# Patient Record
(demographics unavailable — no encounter records)

---

## 2025-01-06 NOTE — HISTORY OF PRESENT ILLNESS
[de-identified] : 05/31/2023 Ms. COLUMBA PRIDE is a 67 year female that comes in today with a chief complaint of Warren knee pt states she aha been having problem with her knees for over 20 years .  08/30/2023: Pt is here to follow up on bilateral knee pain, pt states her pain has worsened since the last visit. She has a hernia operation on 09/20. Ambulating with cane. Pt states she feels a "crunch" when coming down the stairs. Getting out of car is painful.  12/06/2023 : Patient states CSI shot last a couple weeks but wore off. She is here today for bilateral CSI.   01/31/2024: Pt is here today to follow up on bilateral knee. Patient reports no change since the last visit.  Had two surgeries for bowel obstruction.   03/04/2024: Pt is here today to follow up on bilateral knee pain.   04/01/2024: follow up injection Warren knee euflexxa. Inj 1 of 3  04/08/2024: Pt is here today for Euflexxa injection 2 of 3 in bilateral knee  04/15/2024: COLUMBA is here today for BILATERAL knee injection #3.   06/03/2024: Patient is here today to follow up on bilateral knee pain. She reports that she has been experiencing pain lately, R>L pain. There is swelling at the site, pt would like CSI today.   10/28/2024: COLUMBA is here today for BILATERAL knee follow up. she is here for aspiration and CSI today.   01/06/2025:  COLUMBA is here today for BILATERAL knee follow up. she is here for aspiration and CSI today.  
[de-identified] : 05/31/2023 Ms. COLUMBA PRIDE is a 67 year female that comes in today with a chief complaint of Warren knee pt states she aha been having problem with her knees for over 20 years .  08/30/2023: Pt is here to follow up on bilateral knee pain, pt states her pain has worsened since the last visit. She has a hernia operation on 09/20. Ambulating with cane. Pt states she feels a "crunch" when coming down the stairs. Getting out of car is painful.  12/06/2023 : Patient states CSI shot last a couple weeks but wore off. She is here today for bilateral CSI.   01/31/2024: Pt is here today to follow up on bilateral knee. Patient reports no change since the last visit.  Had two surgeries for bowel obstruction.   03/04/2024: Pt is here today to follow up on bilateral knee pain.   04/01/2024: follow up injection Warren knee euflexxa. Inj 1 of 3  04/08/2024: Pt is here today for Euflexxa injection 2 of 3 in bilateral knee  04/15/2024: COLUMBA is here today for BILATERAL knee injection #3.   06/03/2024: Patient is here today to follow up on bilateral knee pain. She reports that she has been experiencing pain lately, R>L pain. There is swelling at the site, pt would like CSI today.   10/28/2024: COLUMBA is here today for BILATERAL knee follow up. she is here for aspiration and CSI today.   01/06/2025:  COLUMBA is here today for BILATERAL knee follow up. she is here for aspiration and CSI today.  
No

## 2025-01-06 NOTE — IMAGING
[de-identified] : Constitutional: well developed and well nourished, able to communicate Cardiovascular: Peripheral vascular exam is grossly normal Neurologic: Alert and oriented, no acute distress. Skin: normal skin with no ulcers, rashes, or lesions Pulmonary: No respiratory distress, breathing comfortably on room air Lymphatics: No obvious lymphadenopathy or lymphedema in areas examined  bilateral KNEE EXAM Alignment: severe varus  Effusion: None Atrophy: None                                                  Stable to Varus/valgus stress Posterior Drawer Test: negative Anterior Drawer Test: Negative Knee Extension/Flexion: 0 / 120  Medial/lateral compartments Medial joint line: POS Tenderness Lateral joint line: No Tenderness Herve test: negative  Patellofemoral joint Medial patellar facet: no tenderness Patellar grind: Negative  Tendons: Pes Anserine: No tenderness Gerdy's Tubercle/ IT Band: No tenderness Quadriceps Tendon: No Tenderness patellar tendon: no Tenderness Tibial tubercle: not tenderness Calf: no Tenderness  Neurovascular exam Muscle strength: 5/5 Sensation to light touch: intact Distal pulses: 2+  [id number] Xrays of the Right Knee were taken demonstrating tricompartmental arthritis with joint space collapse, osteophytes, and sclerosis  2025 XR of bilateral knees with significant progression of OA compared to prior XR, There is significant medial defect with R worse then left.

## 2025-01-06 NOTE — ASSESSMENT
[FreeTextEntry1] : 67 year F with bilateral knee OA - physical therapy, NSAIDs - CSI at this time bilateral knees - declined physical therapy due to her wanting to eval her veins first - hx of cervical cancer  23: bilateral knee OA -bilateral CSI -Pennsaid renewed - significant time spent discussing surgical and nonsurgical options - including PRP injections, CSI, visco, and surgery -Follow up PRN.   2023 CSI of bilateral knees today I,Flakito Acosta M.D. discussed the patients diagnosis and treatment options, non-surgical and surgical, with the patient and/or legal guardian including the potential benefits and complications of each. Potential complications of non-surgical treatments discussed include residual pain and/or disability, non-healing, progression of symptoms and/or disease. Potential complications of surgery discussed include infection, nerve injury, vascular injury, cartilage injury, ligament injury, tendon injury, muscle injury, skin injury, stiffness, instability, weakness, persistent pain, technical or hardware failure, need for additional surgery, re-injury, medical complication, anesthetic related complication, and/or death. All questions were answered. The patient and/or legal guardian has elected to proceed with surgery. Informed consent obtained for Right BECK TKA               Preoperative evaluation and testing as needed is advised within 30 days prior to the procedure.  Access-a- ride is medically necessary for patient  will need CT of the knee at next visit  24: Planning March CSI Discussed potential gel injection, patient defers at this time Planning for eventual TKA once healed from abdominal surgeries  2024: CSI bilateral knees Discussed eventual gel injections follow up when ready for gel injections Eventual TKA once comorbid conditions under control   2024: Euflexxa 1 of 3 R knee asp Follow up in one week for inj #2  2024: Euflexxa 2 of 3 Follow up in one week for last inj  04/15/2024: Euflexxa 3 of 3 Follow up in  for CSI Eventual TKA   2024:  CSI b/l knees  R knee asp 50 cc Follow up in three months for repeat CSI and asp  10/28/2024  CSI B/L knees significant progression of varus deformities aspiration  R 50 cc L 55 cc  2025:  CSI bilateral knees Aspiration b/l knees-seransanginous fluid b/l knees R 80 L 65  severe varus deformity of bilateral knee that has progressed significantly recommend bilateral TKA TS vs hinge knee replacement fluid was aspirated and sent off for fluid analysis and cultures. Will call patient with results  Flakito BREWER M.D. discussed the patients diagnosis and treatment options, non-surgical and surgical, with the patient and/or legal guardian including the potential benefits and complications of each. Potential complications of non-surgical treatments discussed include residual pain and/or disability, non-healing, progression of symptoms and/or disease. Potential complications of surgery discussed include infection, nerve injury, vascular injury, cartilage injury, ligament injury, tendon injury, muscle injury, skin injury, stiffness, instability, weakness, persistent pain, technical or hardware failure, need for additional surgery, re-injury, medical complication, anesthetic related complication, and/or death. All questions were answered. The patient and/or legal guardian has elected to proceed with surgery. Informed consent obtained for Bilateral TKA - TS vs hinge                     Preoperative evaluation and testing as needed is advised within 30 days prior to the procedure.    Time Based billin minutes was spent with the patient today taking the patient's history, conducting a physical examination, reviewing imaging studies, and  detailed discussion regarding the diagnosis and treatment plan.

## 2025-01-06 NOTE — IMAGING
[de-identified] : Constitutional: well developed and well nourished, able to communicate Cardiovascular: Peripheral vascular exam is grossly normal Neurologic: Alert and oriented, no acute distress. Skin: normal skin with no ulcers, rashes, or lesions Pulmonary: No respiratory distress, breathing comfortably on room air Lymphatics: No obvious lymphadenopathy or lymphedema in areas examined  bilateral KNEE EXAM Alignment: severe varus  Effusion: None Atrophy: None                                                  Stable to Varus/valgus stress Posterior Drawer Test: negative Anterior Drawer Test: Negative Knee Extension/Flexion: 0 / 120  Medial/lateral compartments Medial joint line: POS Tenderness Lateral joint line: No Tenderness Herve test: negative  Patellofemoral joint Medial patellar facet: no tenderness Patellar grind: Negative  Tendons: Pes Anserine: No tenderness Gerdy's Tubercle/ IT Band: No tenderness Quadriceps Tendon: No Tenderness patellar tendon: no Tenderness Tibial tubercle: not tenderness Calf: no Tenderness  Neurovascular exam Muscle strength: 5/5 Sensation to light touch: intact Distal pulses: 2+  [id number] Xrays of the Right Knee were taken demonstrating tricompartmental arthritis with joint space collapse, osteophytes, and sclerosis  2025 XR of bilateral knees with significant progression of OA compared to prior XR, There is significant medial defect with R worse then left.

## 2025-01-06 NOTE — PROCEDURE
[FreeTextEntry3] : The risks, benefits and alternatives to the injection were discussed with the patient. The decision was made to proceed with the injection to reduce inflammation within the area. Verbal consent was obtained for the procedure. The KIMANI knee was cleaned with alcohol and ethyl chloride was sprayed topically. 1cc of 40mg Kenalog and 4cc of 1% lidocaine was injected. The patient tolerated the procedure well and was instructed to ice the affected joint as needed later today. Activities can be performed as tolerated Asp bloody fluid, b/l knees  
[FreeTextEntry3] : The risks, benefits and alternatives to the injection were discussed with the patient. The decision was made to proceed with the injection to reduce inflammation within the area. Verbal consent was obtained for the procedure. The KIMANI knee was cleaned with alcohol and ethyl chloride was sprayed topically. 1cc of 40mg Kenalog and 4cc of 1% lidocaine was injected. The patient tolerated the procedure well and was instructed to ice the affected joint as needed later today. Activities can be performed as tolerated Asp bloody fluid, b/l knees  
Fall with Harm Risk

## 2025-03-31 NOTE — HISTORY OF PRESENT ILLNESS
[de-identified] : 05/31/2023 Ms. COLUMBA PRIDE is a 67 year female that comes in today with a chief complaint of Warren knee pt states she aha been having problem with her knees for over 20 years .  08/30/2023: Pt is here to follow up on bilateral knee pain, pt states her pain has worsened since the last visit. She has a hernia operation on 09/20. Ambulating with cane. Pt states she feels a "crunch" when coming down the stairs. Getting out of car is painful.  12/06/2023 : Patient states CSI shot last a couple weeks but wore off. She is here today for bilateral CSI.   01/31/2024: Pt is here today to follow up on bilateral knee. Patient reports no change since the last visit.  Had two surgeries for bowel obstruction.   03/04/2024: Pt is here today to follow up on bilateral knee pain.   04/01/2024: follow up injection Warren knee euflexxa. Inj 1 of 3  04/08/2024: Pt is here today for Euflexxa injection 2 of 3 in bilateral knee  04/15/2024: COLUMBA is here today for BILATERAL knee injection #3.   06/03/2024: Patient is here today to follow up on bilateral knee pain. She reports that she has been experiencing pain lately, R>L pain. There is swelling at the site, pt would like CSI today.   10/28/2024: COLUMBA is here today for BILATERAL knee follow up. she is here for aspiration and CSI today.   01/06/2025:  COLUMBA is here today for BILATERAL knee follow up. she is here for aspiration and CSI today.   03/31/2025 Patient is Here today for follow up. states she slipped and twisted left knee on 3/27/25. states she is in pain and having trouble walking. ambulating with walker

## 2025-03-31 NOTE — IMAGING
[de-identified] : Constitutional: well developed and well nourished, able to communicate Cardiovascular: Peripheral vascular exam is grossly normal Neurologic: Alert and oriented, no acute distress. Skin: normal skin with no ulcers, rashes, or lesions Pulmonary: No respiratory distress, breathing comfortably on room air Lymphatics: No obvious lymphadenopathy or lymphedema in areas examined  bilateral KNEE EXAM Alignment: severe varus  Effusion: None Atrophy: None                                                  Stable to Varus/valgus stress Posterior Drawer Test: negative Anterior Drawer Test: Negative Knee Extension/Flexion: 0 / 120  Medial/lateral compartments Medial joint line: POS Tenderness Lateral joint line: No Tenderness Herve test: negative  Patellofemoral joint Medial patellar facet: no tenderness Patellar grind: Negative  Tendons: Pes Anserine: No tenderness Gerdy's Tubercle/ IT Band: No tenderness Quadriceps Tendon: No Tenderness patellar tendon: no Tenderness Tibial tubercle: not tenderness Calf: no Tenderness  Neurovascular exam Muscle strength: 5/5 Sensation to light touch: intact Distal pulses: 2+  [id number] Xrays of the Right Knee were taken demonstrating tricompartmental arthritis with joint space collapse, osteophytes, and sclerosis  2025 XR of bilateral knees with significant progression of OA compared to prior XR, There is significant medial defect with R worse then left. 2025

## 2025-03-31 NOTE — IMAGING
[de-identified] : Constitutional: well developed and well nourished, able to communicate Cardiovascular: Peripheral vascular exam is grossly normal Neurologic: Alert and oriented, no acute distress. Skin: normal skin with no ulcers, rashes, or lesions Pulmonary: No respiratory distress, breathing comfortably on room air Lymphatics: No obvious lymphadenopathy or lymphedema in areas examined  bilateral KNEE EXAM Alignment: severe varus  Effusion: None Atrophy: None                                                  Stable to Varus/valgus stress Posterior Drawer Test: negative Anterior Drawer Test: Negative Knee Extension/Flexion: 0 / 120  Medial/lateral compartments Medial joint line: POS Tenderness Lateral joint line: No Tenderness Herve test: negative  Patellofemoral joint Medial patellar facet: no tenderness Patellar grind: Negative  Tendons: Pes Anserine: No tenderness Gerdy's Tubercle/ IT Band: No tenderness Quadriceps Tendon: No Tenderness patellar tendon: no Tenderness Tibial tubercle: not tenderness Calf: no Tenderness  Neurovascular exam Muscle strength: 5/5 Sensation to light touch: intact Distal pulses: 2+  [id number] Xrays of the Right Knee were taken demonstrating tricompartmental arthritis with joint space collapse, osteophytes, and sclerosis  2025 XR of bilateral knees with significant progression of OA compared to prior XR, There is significant medial defect with R worse then left. 2025

## 2025-03-31 NOTE — HISTORY OF PRESENT ILLNESS
[de-identified] : 05/31/2023 Ms. COLUMBA PRIDE is a 67 year female that comes in today with a chief complaint of Warren knee pt states she aha been having problem with her knees for over 20 years .  08/30/2023: Pt is here to follow up on bilateral knee pain, pt states her pain has worsened since the last visit. She has a hernia operation on 09/20. Ambulating with cane. Pt states she feels a "crunch" when coming down the stairs. Getting out of car is painful.  12/06/2023 : Patient states CSI shot last a couple weeks but wore off. She is here today for bilateral CSI.   01/31/2024: Pt is here today to follow up on bilateral knee. Patient reports no change since the last visit.  Had two surgeries for bowel obstruction.   03/04/2024: Pt is here today to follow up on bilateral knee pain.   04/01/2024: follow up injection Warren knee euflexxa. Inj 1 of 3  04/08/2024: Pt is here today for Euflexxa injection 2 of 3 in bilateral knee  04/15/2024: COLUMBA is here today for BILATERAL knee injection #3.   06/03/2024: Patient is here today to follow up on bilateral knee pain. She reports that she has been experiencing pain lately, R>L pain. There is swelling at the site, pt would like CSI today.   10/28/2024: COLUMBA is here today for BILATERAL knee follow up. she is here for aspiration and CSI today.   01/06/2025:  COLUMBA is here today for BILATERAL knee follow up. she is here for aspiration and CSI today.   03/31/2025 Patient is Here today for follow up. states she slipped and twisted left knee on 3/27/25. states she is in pain and having trouble walking. ambulating with walker

## 2025-03-31 NOTE — IMAGING
[de-identified] : Constitutional: well developed and well nourished, able to communicate Cardiovascular: Peripheral vascular exam is grossly normal Neurologic: Alert and oriented, no acute distress. Skin: normal skin with no ulcers, rashes, or lesions Pulmonary: No respiratory distress, breathing comfortably on room air Lymphatics: No obvious lymphadenopathy or lymphedema in areas examined  bilateral KNEE EXAM Alignment: severe varus  Effusion: None Atrophy: None                                                  Stable to Varus/valgus stress Posterior Drawer Test: negative Anterior Drawer Test: Negative Knee Extension/Flexion: 0 / 120  Medial/lateral compartments Medial joint line: POS Tenderness Lateral joint line: No Tenderness Herve test: negative  Patellofemoral joint Medial patellar facet: no tenderness Patellar grind: Negative  Tendons: Pes Anserine: No tenderness Gerdy's Tubercle/ IT Band: No tenderness Quadriceps Tendon: No Tenderness patellar tendon: no Tenderness Tibial tubercle: not tenderness Calf: no Tenderness  Neurovascular exam Muscle strength: 5/5 Sensation to light touch: intact Distal pulses: 2+  [id number] Xrays of the Right Knee were taken demonstrating tricompartmental arthritis with joint space collapse, osteophytes, and sclerosis  2025 XR of bilateral knees with significant progression of OA compared to prior XR, There is significant medial defect with R worse then left. 2025

## 2025-03-31 NOTE — ASSESSMENT
[FreeTextEntry1] : 67 year F with bilateral knee OA - physical therapy, NSAIDs - CSI at this time bilateral knees - declined physical therapy due to her wanting to eval her veins first - hx of cervical cancer  23: bilateral knee OA -bilateral CSI -Pennsaid renewed - significant time spent discussing surgical and nonsurgical options - including PRP injections, CSI, visco, and surgery -Follow up PRN.   2023 CSI of bilateral knees today I,Flakito Acosta M.D. discussed the patients diagnosis and treatment options, non-surgical and surgical, with the patient and/or legal guardian including the potential benefits and complications of each. Potential complications of non-surgical treatments discussed include residual pain and/or disability, non-healing, progression of symptoms and/or disease. Potential complications of surgery discussed include infection, nerve injury, vascular injury, cartilage injury, ligament injury, tendon injury, muscle injury, skin injury, stiffness, instability, weakness, persistent pain, technical or hardware failure, need for additional surgery, re-injury, medical complication, anesthetic related complication, and/or death. All questions were answered. The patient and/or legal guardian has elected to proceed with surgery. Informed consent obtained for Right BECK TKA               Preoperative evaluation and testing as needed is advised within 30 days prior to the procedure.  Access-a- ride is medically necessary for patient  will need CT of the knee at next visit  24: Planning March CSI Discussed potential gel injection, patient defers at this time Planning for eventual TKA once healed from abdominal surgeries  2024: CSI bilateral knees Discussed eventual gel injections follow up when ready for gel injections Eventual TKA once comorbid conditions under control   2024: Euflexxa 1 of 3 R knee asp Follow up in one week for inj #2  2024: Euflexxa 2 of 3 Follow up in one week for last inj  04/15/2024: Euflexxa 3 of 3 Follow up in  for CSI Eventual TKA   2024:  CSI b/l knees  R knee asp 50 cc Follow up in three months for repeat CSI and asp  10/28/2024  CSI B/L knees significant progression of varus deformities aspiration  R 50 cc L 55 cc  2025:  CSI bilateral knees Aspiration b/l knees-seransanginous fluid b/l knees R 80 L 65  severe varus deformity of bilateral knee that has progressed significantly recommend bilateral TKA TS vs hinge knee replacement fluid was aspirated and sent off for fluid analysis and cultures. Will call patient with results  Flakito BREWER M.D. discussed the patients diagnosis and treatment options, non-surgical and surgical, with the patient and/or legal guardian including the potential benefits and complications of each. Potential complications of non-surgical treatments discussed include residual pain and/or disability, non-healing, progression of symptoms and/or disease. Potential complications of surgery discussed include infection, nerve injury, vascular injury, cartilage injury, ligament injury, tendon injury, muscle injury, skin injury, stiffness, instability, weakness, persistent pain, technical or hardware failure, need for additional surgery, re-injury, medical complication, anesthetic related complication, and/or death. All questions were answered. The patient and/or legal guardian has elected to proceed with surgery. Informed consent obtained for Bilateral TKA - TS vs hinge                     Preoperative evaluation and testing as needed is advised within 30 days prior to the procedure.  2025: Flakito BREWER M.D. discussed the patients diagnosis and treatment options, non-surgical and surgical, with the patient and/or legal guardian including the potential benefits and complications of each. Potential complications of non-surgical treatments discussed include residual pain and/or disability, non-healing, progression of symptoms and/or disease. Potential complications of surgery discussed include infection, nerve injury, vascular injury, cartilage injury, ligament injury, tendon injury, muscle injury, skin injury, stiffness, instability, weakness, persistent pain, technical or hardware failure, need for additional surgery, re-injury, medical complication, anesthetic related complication, and/or death. All questions were answered. The patient and/or legal guardian has elected to proceed with surgery. Informed consent obtained for RIght BECK TKA - hinged knee replacement   will need lymph nodes checked out by oncologist                    Preoperative evaluation and testing as needed is advised within 30 days prior to the procedure.    Time Based billin minutes was spent with the patient today taking the patient's history, conducting a physical examination, reviewing imaging studies, and  detailed discussion regarding the diagnosis and treatment plan.

## 2025-03-31 NOTE — HISTORY OF PRESENT ILLNESS
[de-identified] : 05/31/2023 Ms. COLUMBA PRIDE is a 67 year female that comes in today with a chief complaint of Warren knee pt states she aha been having problem with her knees for over 20 years .  08/30/2023: Pt is here to follow up on bilateral knee pain, pt states her pain has worsened since the last visit. She has a hernia operation on 09/20. Ambulating with cane. Pt states she feels a "crunch" when coming down the stairs. Getting out of car is painful.  12/06/2023 : Patient states CSI shot last a couple weeks but wore off. She is here today for bilateral CSI.   01/31/2024: Pt is here today to follow up on bilateral knee. Patient reports no change since the last visit.  Had two surgeries for bowel obstruction.   03/04/2024: Pt is here today to follow up on bilateral knee pain.   04/01/2024: follow up injection Awrren knee euflexxa. Inj 1 of 3  04/08/2024: Pt is here today for Euflexxa injection 2 of 3 in bilateral knee  04/15/2024: COLUMBA is here today for BILATERAL knee injection #3.   06/03/2024: Patient is here today to follow up on bilateral knee pain. She reports that she has been experiencing pain lately, R>L pain. There is swelling at the site, pt would like CSI today.   10/28/2024: COLUMBA is here today for BILATERAL knee follow up. she is here for aspiration and CSI today.   01/06/2025:  COLUMBA is here today for BILATERAL knee follow up. she is here for aspiration and CSI today.   03/31/2025 Patient is Here today for follow up. states she slipped and twisted left knee on 3/27/25. states she is in pain and having trouble walking. ambulating with walker

## 2025-05-22 NOTE — DATA REVIEWED
[FreeTextEntry1] : 4/2025 ct chest [facility] 4/2025 IMPRESSION: Mildly displaced left clavicle fracture with extensive surrounding soft tissue swelling.
[Time Spent: ___ minutes] : I have spent [unfilled] minutes of time on the encounter.

## 2025-05-22 NOTE — DISCUSSION/SUMMARY
[Medication Risks Reviewed] : Medication risks reviewed [de-identified] : 68 yo F with atraumatic L clavicle fx with history of cervical cancer with hx of hysterectomy, to patients knowledge no hx of bony mets but unclear Pt gets q6 months PET scans, next one schedule for tomorrow rec: [facility] as best as possible and vitamin D LUE  FU in 3 weeks  next visit: PET scan will be in [facility] L clavicle xr

## 2025-05-22 NOTE — HISTORY OF PRESENT ILLNESS
[de-identified] : May 22, 2025 Date of Injury/Onset: 04/09/2025 Pain: At Rest: 3 With Activity: 4 Mechanism of injury: Patient is here today for evaluation of right clavicle, pain began on April 9th, states she picked up a heavy bucket, she visited [facility]-scan done. Patient denies N/T, mentions bony abnormality. Pain indicated to clavicle area, 5/10, intermittent, achy in nature.  worsening with certain body/arm motions. some relief with rest.   This is NOT a Work Related Injury being treated under Worker's Compensation. This is NOT an athletic injury occurring associated with an interscholastic or organized sports team. Quality of symptoms: weakness Improves with: Motrin Worse with: lifting, sleeping Prior treatment:  Prior Imaging:  Reports Available For Review Today: Out of work/sport:  School/Sport/Position/Occupation:  hx of cervical cancer with hysterectomy q6 months PET scan

## 2025-05-22 NOTE — IMAGING
[de-identified] :  LEFT SHOULDER  Inspection: skin intact  Palpation: tenderness is noted   Range of motion: deferred Strength: pain with testing  Neurological testing: motor and sensor intact distally.